# Patient Record
Sex: MALE | Race: WHITE | NOT HISPANIC OR LATINO
[De-identification: names, ages, dates, MRNs, and addresses within clinical notes are randomized per-mention and may not be internally consistent; named-entity substitution may affect disease eponyms.]

---

## 2021-09-24 ENCOUNTER — NON-APPOINTMENT (OUTPATIENT)
Age: 46
End: 2021-09-24

## 2021-09-27 PROBLEM — Z00.00 ENCOUNTER FOR PREVENTIVE HEALTH EXAMINATION: Status: ACTIVE | Noted: 2021-09-27

## 2021-09-28 ENCOUNTER — APPOINTMENT (OUTPATIENT)
Dept: PAIN MANAGEMENT | Facility: CLINIC | Age: 46
End: 2021-09-28
Payer: COMMERCIAL

## 2021-09-28 VITALS
DIASTOLIC BLOOD PRESSURE: 81 MMHG | WEIGHT: 165 LBS | BODY MASS INDEX: 22.35 KG/M2 | TEMPERATURE: 98 F | SYSTOLIC BLOOD PRESSURE: 117 MMHG | HEIGHT: 72 IN

## 2021-09-28 DIAGNOSIS — Z78.9 OTHER SPECIFIED HEALTH STATUS: ICD-10-CM

## 2021-09-28 PROCEDURE — 99204 OFFICE O/P NEW MOD 45 MIN: CPT

## 2021-09-28 NOTE — ASSESSMENT
[FreeTextEntry1] : 45 yom presents for initial visit with severe right-sided low back pain. There has been a severe exacerbation of the patient's chronic pain. Quality of life is impaired.\par \par The patient has failed to have relief with over six weeks of physical therapy within the last three months and all medications. GIven their failure to improve with all other conservative measures recommend MRI lumbar spine. Patient will return to review imaging and plan for potential intervention.\par \par NSAIDS prn.\par \par Physical therapy - increase ROM, strengthening, postural training, other modalities ad asuncion\par

## 2021-09-28 NOTE — PHYSICAL EXAM

## 2021-09-28 NOTE — HISTORY OF PRESENT ILLNESS
[0 (No Pain)] : 1. What number best describes your pain on average in the past week? 0/10 pain [Back Pain] : back pain [___ yrs] : [unfilled] year(s) ago [Paroxysmal] : paroxysmal [5] : an average pain level of 5/10 [0] : a minimum pain level of 0/10 [10] : a maximum pain level of 10/10 [Sharp] : sharp [Throbbing] : throbbing [Burning] : burning [Medications] : medications [Heat] : heat [Ice] : ice [FreeTextEntry1] : 45 yom presents w/ right-sided low back pain that radiates to the buttock. He has had intermittent pain in his right low back for about five years. Left side is normal. Pain is worst w/ flexion. Quality of life is impaired. There has been a severe exacerbation of the patient's chronic pain.  [FreeTextEntry2] : 10 [FreeTextEntry7] : lower back spasms [FreeTextEntry3] : reaching  [FreeTextEntry4] : stretching

## 2021-10-14 ENCOUNTER — APPOINTMENT (OUTPATIENT)
Dept: PAIN MANAGEMENT | Facility: CLINIC | Age: 46
End: 2021-10-14
Payer: COMMERCIAL

## 2021-10-14 VITALS
WEIGHT: 165 LBS | DIASTOLIC BLOOD PRESSURE: 72 MMHG | HEIGHT: 72 IN | TEMPERATURE: 98 F | BODY MASS INDEX: 22.35 KG/M2 | SYSTOLIC BLOOD PRESSURE: 117 MMHG

## 2021-10-14 DIAGNOSIS — M47.817 SPONDYLOSIS W/OUT MYELOPATHY OR RADICULOPATHY, LUMBOSACRAL REGION: ICD-10-CM

## 2021-10-14 DIAGNOSIS — M54.16 RADICULOPATHY, LUMBAR REGION: ICD-10-CM

## 2021-10-14 DIAGNOSIS — M79.10 MYALGIA, UNSPECIFIED SITE: ICD-10-CM

## 2021-10-14 PROCEDURE — 99214 OFFICE O/P EST MOD 30 MIN: CPT

## 2021-10-14 NOTE — ASSESSMENT
[FreeTextEntry1] : 45 yom presents for initial visit with severe right-sided low back pain here to review MRI.\par \par I have personally reviewed the patient's MRI in detail and discussed it with them which is significant for annular fissures which are the likely source of his pain.\par \par NSAIDS prn.\par \par Physical therapy - increase ROM, strengthening, postural training, other modalities ad asuncion. I have called [facility] to assist with this. \par \par If no relief we would consider LILY. \par

## 2021-10-14 NOTE — DATA REVIEWED
[No studies available for review at this time.] : No studies available for review at this time. [FreeTextEntry1] : EXAM:  MRI LUMBAR SPINE WITHOUT CONTRAST\par \par FINDINGS: Vertebral body height and marrow signal are normal.\par \par There is disc desiccation and disc narrowing at T11-12.\par \par At T12-L1, L1-2, L2-3 and L3-4 discs are normal. There is mild facet arthropathy at L3-4.\par \par At L4-5 there is disc desiccation and mild bulging of the disc annulus. There is an anterior annular fissure. Is mild facet arthropathy\par \par At L5-S1 there is disc desiccation and disc narrowing with mild bulging of disc annulus and a posterior annular fissure. \par \par There is no intradural lesion.  The conus medullaris is unremarkable. There is no paraspinal mass.\par \par IMPRESSION:\par \par Mild facet arthropathy at at L3-4.\par \par Disc bulge at L4-5 and anterior annular fissure with mild facet arthropathy.\par \par Disc bulge at L5-S1 and posterior annular fissure.\par \par No herniated disc or spinal canal or foraminal stenosis.\par \par Thank you for the opportunity to participate in the care of this patient.

## 2021-10-14 NOTE — PHYSICAL EXAM

## 2021-10-14 NOTE — HISTORY OF PRESENT ILLNESS
[0 (No Pain)] : 1. What number best describes your pain on average in the past week? 0/10 pain [Back Pain] : back pain [___ yrs] : [unfilled] year(s) ago [Paroxysmal] : paroxysmal [5] : an average pain level of 5/10 [0] : a minimum pain level of 0/10 [10] : a maximum pain level of 10/10 [Sharp] : sharp [Throbbing] : throbbing [Burning] : burning [Medications] : medications [Heat] : heat [Ice] : ice [FreeTextEntry1] : Interval History:\par Patient returns for follow-up today to discuss his low back pain. He wishes to review his MRI. Pain remains at baseline. He wishes to consider physical therapy. \par \par HPI: 45 yom presents w/ right-sided low back pain that radiates to the buttock. He has had intermittent pain in his right low back for about five years. Left side is normal. Pain is worst w/ flexion. Quality of life is impaired. There has been a severe exacerbation of the patient's chronic pain.  [FreeTextEntry2] : 10 [FreeTextEntry7] : lower back spasms [FreeTextEntry3] : reaching  [FreeTextEntry4] : stretching

## 2022-06-18 ENCOUNTER — NON-APPOINTMENT (OUTPATIENT)
Age: 47
End: 2022-06-18

## 2024-08-30 ENCOUNTER — NON-APPOINTMENT (OUTPATIENT)
Age: 49
End: 2024-08-30

## 2024-11-06 ENCOUNTER — APPOINTMENT (OUTPATIENT)
Dept: GASTROENTEROLOGY | Facility: CLINIC | Age: 49
End: 2024-11-06

## 2025-09-18 ENCOUNTER — APPOINTMENT (OUTPATIENT)
Dept: GASTROENTEROLOGY | Facility: CLINIC | Age: 50
End: 2025-09-18